# Patient Record
Sex: FEMALE | Race: NATIVE HAWAIIAN OR OTHER PACIFIC ISLANDER | NOT HISPANIC OR LATINO | Employment: STUDENT | URBAN - METROPOLITAN AREA
[De-identification: names, ages, dates, MRNs, and addresses within clinical notes are randomized per-mention and may not be internally consistent; named-entity substitution may affect disease eponyms.]

---
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
## 2019-10-04 ENCOUNTER — HOSPITAL ENCOUNTER (EMERGENCY)
Facility: HOSPITAL | Age: 19
Discharge: HOME | End: 2019-10-04
Attending: EMERGENCY MEDICINE | Admitting: EMERGENCY MEDICINE
Payer: COMMERCIAL

## 2019-10-04 VITALS
RESPIRATION RATE: 18 BRPM | BODY MASS INDEX: 28.1 KG/M2 | TEMPERATURE: 97.8 F | WEIGHT: 185.41 LBS | SYSTOLIC BLOOD PRESSURE: 121 MMHG | OXYGEN SATURATION: 100 % | HEART RATE: 89 BPM | DIASTOLIC BLOOD PRESSURE: 90 MMHG | HEIGHT: 68 IN

## 2019-10-04 DIAGNOSIS — H10.9 CONJUNCTIVITIS: Primary | ICD-10-CM

## 2019-10-04 PROCEDURE — 99284 EMERGENCY DEPT VISIT MOD MDM: CPT | Performed by: EMERGENCY MEDICINE

## 2019-10-04 PROCEDURE — 99283 EMERGENCY DEPT VISIT LOW MDM: CPT

## 2019-10-04 RX ORDER — OXYMETAZOLINE HYDROCHLORIDE 0.05 G/100ML
2 SPRAY NASAL ONCE
Status: COMPLETED | OUTPATIENT
Start: 2019-10-04 | End: 2019-10-04

## 2019-10-04 RX ORDER — TETRACAINE HYDROCHLORIDE 5 MG/ML
2 SOLUTION OPHTHALMIC ONCE
Status: COMPLETED | OUTPATIENT
Start: 2019-10-04 | End: 2019-10-04

## 2019-10-04 RX ORDER — LEVOFLOXACIN 5 MG/ML
SOLUTION/ DROPS TOPICAL
Qty: 5 ML | Refills: 0 | Status: SHIPPED | OUTPATIENT
Start: 2019-10-04 | End: 2019-10-11

## 2019-10-04 RX ADMIN — OXYMETAZOLINE HYDROCHLORIDE 2 SPRAY: 0.05 SPRAY NASAL at 02:14

## 2019-10-04 RX ADMIN — FLUORESCEIN SODIUM 2 STRIP: 1 STRIP OPHTHALMIC at 02:15

## 2019-10-04 RX ADMIN — TETRACAINE HYDROCHLORIDE 2 DROP: 5 SOLUTION OPHTHALMIC at 02:15

## 2019-10-04 NOTE — ED PROVIDER NOTES
History  Chief Complaint   Patient presents with    Eye Pain     States if she wears her contacts too long, her eyes usually get really irritated, but tonight it was more irritated than usual  c/o L eye irritation +redness -drainage      HPI     Patient is a 20year old female who presents with bilateral eye redness after waering her contacts for too long  She has a history of similar episodes int he past      No f/c/s/n/v/d  No cp or sob  No abdominal pain  No dysuria, hematuria  No ocular foreign bodies, no visualized corneal abrasions, no discharge, only tearing  MDM well appearing 20 yof, treat for corneal irritation 2/2 contact lenses, followup with optho  The patient (and any family present) verbalized understanding of the discharge instructions and warnings that would necessitate return to the Emergency Department  Gave verbal in addition to written discharge instructions  Specifically highlighted areas of special concern regarding the discharge instructions and return precautions  Furthermore, I expressed that the follow up instructions were serious and should not be simply dismissed  Follow up is an integral part of the patients care and should NOT be taken lightly or dismissed  Patient expressed understanding of this and understands that follow up is now their responsibility  All questions were answered prior to discharge  Prior to Admission Medications   Prescriptions Last Dose Informant Patient Reported? Taking?   dicyclomine (BENTYL) 10 mg capsule   No No   Sig: Take 1 capsule (10 mg total) by mouth 2 (two) times a day for 30 days   ondansetron (ZOFRAN) 4 mg tablet   No No   Sig: Take 1 tablet (4 mg total) by mouth every 8 (eight) hours as needed for nausea or vomiting   pantoprazole (PROTONIX) 20 mg tablet   No No   Sig: Take 1 tablet (20 mg total) by mouth 2 (two) times a day      Facility-Administered Medications: None       History reviewed   No pertinent past medical history  Past Surgical History:   Procedure Laterality Date    TONSILLECTOMY      TONSILLECTOMY      WISDOM TOOTH EXTRACTION         Family History   Problem Relation Age of Onset    Colon polyps Mother     Bipolar disorder Mother     Hypertension Mother     Hypertension Father     Colon polyps Maternal Grandmother     Hypertension Maternal Grandmother     Mental illness Maternal Grandfather     Hypertension Maternal Grandfather     Diabetes Maternal Grandfather     Colon polyps Maternal Aunt      I have reviewed and agree with the history as documented  Social History     Tobacco Use    Smoking status: Never Smoker    Smokeless tobacco: Never Used   Substance Use Topics    Alcohol use: Yes     Comment: socially    Drug use: No        Review of Systems   Eyes: Positive for pain and redness  All other systems reviewed and are negative  Physical Exam  Physical Exam   Constitutional: She is oriented to person, place, and time  She appears well-developed and well-nourished  HENT:   Head: Normocephalic and atraumatic  Right Ear: External ear normal    Left Ear: External ear normal    Eyes: EOM are normal    Conjunctival injection   Neck: Normal range of motion  Neck supple  No JVD present  No tracheal deviation present  Cardiovascular: Normal rate, regular rhythm and normal heart sounds  Pulmonary/Chest: Effort normal  No respiratory distress  She has no wheezes  She has no rales  Abdominal: Soft  Bowel sounds are normal  There is no tenderness  There is no rebound and no guarding  Musculoskeletal: She exhibits no edema or tenderness  Neurological: She is alert and oriented to person, place, and time  Skin: Skin is warm and dry  No rash noted  No erythema  Psychiatric: She has a normal mood and affect  Thought content normal    Nursing note and vitals reviewed        Vital Signs  ED Triage Vitals [10/04/19 0137]   Temperature Pulse Respirations Blood Pressure SpO2   97 8 °F (36 6 °C) 89 18 121/90 100 %      Temp Source Heart Rate Source Patient Position - Orthostatic VS BP Location FiO2 (%)   Oral Monitor Sitting Right arm --      Pain Score       5           Vitals:    10/04/19 0137   BP: 121/90   Pulse: 89   Patient Position - Orthostatic VS: Sitting         Visual Acuity      ED Medications  Medications   sterile water injection **ADS Override Pull** (10 mL  Not Given 10/4/19 0221)   tetracaine 0 5 % ophthalmic solution 2 drop (2 drops Both Eyes Given 10/4/19 0215)   fluorescein sodium sterile ophthalmic strip 2 strip (2 strips Both Eyes Given by Other 10/4/19 0215)   oxymetazoline (AFRIN) 0 05 % nasal spray 2 spray (2 sprays Each Nare Given 10/4/19 0214)       Diagnostic Studies  Results Reviewed     None                 No orders to display              Procedures  Procedures       ED Course  ED Course as of Oct 04 0255   Fri Oct 04, 2019   0200 Zyprexa ordered!!!                                  MDM    Disposition  Final diagnoses:   Conjunctivitis     Time reflects when diagnosis was documented in both MDM as applicable and the Disposition within this note     Time User Action Codes Description Comment    10/4/2019  2:30 AM Tiarra MORROW Add [H10 9] Conjunctivitis       ED Disposition     ED Disposition Condition Date/Time Comment    Discharge Stable Fri Oct 4, 2019  2:30 AM Za Villegas discharge to home/self care  Follow-up Information     Follow up With Specialties Details Why Luba Shi MD Ophthalmology, Pediatric Ophthalmology In 1 day  [address]            Patient's Medications   Discharge Prescriptions    LEVOFLOXACIN (QUIXIN) 0 5 % OPHTHALMIC SOLUTION    Administer 1 drop to both eyes every 2 (two) hours for 2 days, THEN 2 drops every 6 (six) hours for 5 days         Start Date: 10/4/2019 End Date: 10/11/2019       Order Dose: --       Quantity: 5 mL Refills: 0     No discharge procedures on file      ED Provider  Electronically Signed by           Katelin Qureshi MD  10/04/19 7160

## 2019-10-14 PROBLEM — R19.4 CHANGE IN BOWEL HABITS: Status: RESOLVED | Noted: 2018-03-22 | Resolved: 2019-10-14

## 2019-10-14 PROBLEM — R11.0 NAUSEA: Status: RESOLVED | Noted: 2018-03-22 | Resolved: 2019-10-14

## 2019-10-14 PROBLEM — Z01.419 WELL WOMAN EXAM: Status: ACTIVE | Noted: 2019-10-14

## 2019-10-14 NOTE — PROGRESS NOTES
Assessment/Plan   Diagnoses and all orders for this visit:    Well woman exam    Encounter for contraceptive management, unspecified type  -     norethindrone-ethinyl estradiol (JUNEL FE 1/20) 1-20 MG-MCG per tablet; Take 1 tablet by mouth daily        Discussion    All questions have been answered to her satisfaction  RTO for APE or sooner if needed    Subjective     Pt for annual GYN exam  LMP approx 3 5 weeks ago  Periods q 23 days and last approx 4 days  They are tolerable and regular  Currently using cond[facility] for [facility]  Pt desires BC start up  Coitarche approx 1 week ago  Her partner has had previous partners  He had full STD work up prior to them becoming sexually active  Odell c/o vaginal d/c  Very minimal, no itching or irritation  Does have some mild odor that she occasionally notices with it  Pt declines STD work up  Barbi Celestin is a 20 y o  female who presents for annual well woman exam    Menarche -14 ; LMP - end of Septemeber; Periods are reg q 23 days and last 4 days; No excessive bleeding; No intermenstrual bleeding or spotting; Cramps are tolerable  No vulvar itch/burn; No vaginal itch/burn; No urinary sx - burning/pain/frequency/hematuria  (+) SBEs - no breast masses, asymmetry, nipple discharge or bleeding, changes in skin of breast, or breast tenderness bilaterally  No abd/pelvic pain or HAs;   Pt is newly sexually active in a mutually monog sexual relationship; No issues with intercourse; She declines std/hiv/hep testing; Feels safe at home  Current contraception: condoms   Condom use:yes   Gardasil - not yet, I fully counseled pt on usage and advised my recommendation to obtain series  (+) PCP for routine Bw/care; Last Pap - NA  Last STD screen - none yet, declines today    Review of Systems   Constitutional: Negative  Respiratory: Negative  Gastrointestinal: Negative  Endocrine: Negative  Genitourinary: Negative          The following portions of the patient's history were reviewed and updated as appropriate: allergies, current medications, past family history, past medical history, past social history, past surgical history and problem list          OB History        0    Para   0    Term   0       0    AB   0    Living   0       SAB   0    TAB   0    Ectopic   0    Multiple   0    Live Births   0                 No past medical history on file      Past Surgical History:   Procedure Laterality Date    TONSILLECTOMY      TONSILLECTOMY      WISDOM TOOTH EXTRACTION         Family History   Problem Relation Age of Onset    Colon polyps Mother     Bipolar disorder Mother     Hypertension Mother     Hypertension Father     Colon polyps Maternal Grandmother     Hypertension Maternal Grandmother     Mental illness Maternal Grandfather     Hypertension Maternal Grandfather     Diabetes Maternal Grandfather     Colon polyps Maternal Aunt        Social History     Socioeconomic History    Marital status: Single     Spouse name: Not on file    Number of children: Not on file    Years of education: Not on file    Highest education level: Not on file   Occupational History    Not on file   Social Needs    Financial resource strain: Not on file    Food insecurity:     Worry: Not on file     Inability: Not on file    Transportation needs:     Medical: Not on file     Non-medical: Not on file   Tobacco Use    Smoking status: Never Smoker    Smokeless tobacco: Never Used   Substance and Sexual Activity    Alcohol use: Yes     Comment: socially    Drug use: No    Sexual activity: Yes   Lifestyle    Physical activity:     Days per week: Not on file     Minutes per session: Not on file    Stress: Not on file   Relationships    Social connections:     Talks on phone: Not on file     Gets together: Not on file     Attends Orthodoxy service: Not on file     Active member of club or organization: Not on file     Attends meetings of clubs or organizations: Not on file     Relationship status: Not on file    Intimate partner violence:     Fear of current or ex partner: Not on file     Emotionally abused: Not on file     Physically abused: Not on file     Forced sexual activity: Not on file   Other Topics Concern    Not on file   Social History Narrative    Not on file         Current Outpatient Medications:     norethindrone-ethinyl estradiol (Jacklyn Flurry FE 1/20) 1-20 MG-MCG per tablet, Take 1 tablet by mouth daily, Disp: 28 tablet, Rfl: 3    Allergies   Allergen Reactions    Cat Hair Extract     Pollen Extract        Objective   Vitals:    10/15/19 1304   BP: 138/76   BP Location: Left arm   Patient Position: Sitting   Cuff Size: Standard   Weight: 86 kg (189 lb 9 6 oz)   Height: 5' 8" (1 727 m)     Physical Exam   Constitutional: She is oriented to person, place, and time  She appears well-developed and well-nourished  HENT:   Head: Normocephalic and atraumatic  Cardiovascular: Normal rate and regular rhythm  Pulmonary/Chest: Effort normal and breath sounds normal  Right breast exhibits no inverted nipple, no mass, no nipple discharge, no skin change and no tenderness  Left breast exhibits no inverted nipple, no mass, no nipple discharge, no skin change and no tenderness  Breasts are symmetrical    Abdominal: Soft  She exhibits no distension and no mass  There is no rebound and no guarding  Genitourinary: Vagina normal and uterus normal  No vaginal discharge found  Genitourinary Comments: Light amount of blood present in vaginal vault c/w oncoming menses  Neurological: She is alert and oriented to person, place, and time  Skin: Skin is warm and dry  Psychiatric: She has a normal mood and affect  Patient Instructions   Fly counseled pt on all BC options  Pt desiers OCPs  R/w pt start up, usage, back up, condoms for STD protection  Will plan to start Sunday and plan 3 mth pill chek     Advised pt could trial acidophilus for vaginal d/c, although no signs if infection noted today  Fully counseled pt on Gardasil series and coverage  Info given to pt, she will review and let us know if she would like to proceed with it

## 2019-10-15 ENCOUNTER — OFFICE VISIT (OUTPATIENT)
Dept: OBGYN CLINIC | Facility: CLINIC | Age: 19
End: 2019-10-15
Payer: COMMERCIAL

## 2019-10-15 VITALS
DIASTOLIC BLOOD PRESSURE: 76 MMHG | WEIGHT: 189.6 LBS | BODY MASS INDEX: 28.73 KG/M2 | HEIGHT: 68 IN | SYSTOLIC BLOOD PRESSURE: 138 MMHG

## 2019-10-15 DIAGNOSIS — Z01.419 WELL WOMAN EXAM: Primary | ICD-10-CM

## 2019-10-15 DIAGNOSIS — Z30.9 ENCOUNTER FOR CONTRACEPTIVE MANAGEMENT, UNSPECIFIED TYPE: ICD-10-CM

## 2019-10-15 PROCEDURE — 99385 PREV VISIT NEW AGE 18-39: CPT | Performed by: PHYSICIAN ASSISTANT

## 2019-10-15 RX ORDER — NORETHINDRONE ACETATE AND ETHINYL ESTRADIOL 1MG-20(21)
1 KIT ORAL DAILY
Qty: 28 TABLET | Refills: 3 | Status: SHIPPED | OUTPATIENT
Start: 2019-10-15 | End: 2020-01-08 | Stop reason: ALTCHOICE

## 2019-10-15 NOTE — PATIENT INSTRUCTIONS
Fly counseled pt on all BC options  Pt desiers OCPs  R/w pt start up, usage, back up, condoms for STD protection  Will plan to start Sunday and plan 3 mth pill amita  Advised pt could trial acidophilus for vaginal d/c, although no signs if infection noted today  Fully counseled pt on Gardasil series and coverage  Info given to pt, she will review and let us know if she would like to proceed with it

## 2020-01-08 ENCOUNTER — OFFICE VISIT (OUTPATIENT)
Dept: OBGYN CLINIC | Facility: CLINIC | Age: 20
End: 2020-01-08
Payer: COMMERCIAL

## 2020-01-08 VITALS
WEIGHT: 190.4 LBS | SYSTOLIC BLOOD PRESSURE: 108 MMHG | HEIGHT: 68 IN | BODY MASS INDEX: 28.85 KG/M2 | DIASTOLIC BLOOD PRESSURE: 74 MMHG

## 2020-01-08 DIAGNOSIS — Z30.9 ENCOUNTER FOR CONTRACEPTIVE MANAGEMENT, UNSPECIFIED TYPE: Primary | ICD-10-CM

## 2020-01-08 PROCEDURE — 99212 OFFICE O/P EST SF 10 MIN: CPT | Performed by: PHYSICIAN ASSISTANT

## 2020-01-08 RX ORDER — NORGESTIMATE AND ETHINYL ESTRADIOL 7DAYSX3 LO
1 KIT ORAL DAILY
Qty: 28 TABLET | Refills: 11 | Status: SHIPPED | OUTPATIENT
Start: 2020-01-08 | End: 2020-11-05

## 2020-01-08 NOTE — PATIENT INSTRUCTIONS
Will plan to change to new pill at the end of this week  Will stop pack today, allow menses to finish (she had skipped placebo and begun new pack to try to stop her menses but was currently bleeding) and then plan to start OCPs on Sunday  All questions answered

## 2020-01-08 NOTE — PROGRESS NOTES
Assessment/Plan:      Diagnoses and all orders for this visit:    Encounter for contraceptive management, unspecified type  -     norgestimate-ethinyl estradiol (ORTHO TRI-CYCLEN LO) 0 18/0 215/0 25 MG-25 MCG per tablet; Take 1 tablet by mouth daily          Subjective:     Patient ID: Jonathan Rascon is a 20 y o  female  Pt for pill check  Does complain of increased acne with this pill desires change  Feels well otherwise, periods normal coming as expected they are a little lighter  Does c/o headaches off and on as well as increased breast tenderness  Does chiki think cramping is worse  Will trial ortho tri lo to see if sx improve  Review of Systems   Constitutional: Negative  Respiratory: Negative  Gastrointestinal: Negative  Endocrine: Negative  Genitourinary: Positive for menstrual problem  Objective:     Physical Exam   Constitutional: She is oriented to person, place, and time  She appears well-developed and well-nourished  Cardiovascular: Normal rate and regular rhythm  Pulmonary/Chest: Effort normal and breath sounds normal    Neurological: She is alert and oriented to person, place, and time  Psychiatric: She has a normal mood and affect  Her behavior is normal  Judgment and thought content normal          Patient Instructions   Will plan to change to new pill at the end of this week  Will stop pack today, allow menses to finish (she had skipped placebo and begun new pack to try to stop her menses but was currently bleeding) and then plan to start OCPs on Sunday  All questions answered

## 2020-06-07 ENCOUNTER — OFFICE VISIT (OUTPATIENT)
Dept: URGENT CARE | Facility: CLINIC | Age: 20
End: 2020-06-07
Payer: COMMERCIAL

## 2020-06-07 VITALS
DIASTOLIC BLOOD PRESSURE: 78 MMHG | SYSTOLIC BLOOD PRESSURE: 116 MMHG | TEMPERATURE: 96.9 F | HEART RATE: 129 BPM | BODY MASS INDEX: 26.83 KG/M2 | RESPIRATION RATE: 20 BRPM | HEIGHT: 68 IN | WEIGHT: 177 LBS

## 2020-06-07 DIAGNOSIS — L02.33: Primary | ICD-10-CM

## 2020-06-07 PROCEDURE — G0382 LEV 3 HOSP TYPE B ED VISIT: HCPCS | Performed by: FAMILY MEDICINE

## 2020-06-07 RX ORDER — CEPHALEXIN 500 MG/1
500 CAPSULE ORAL EVERY 12 HOURS SCHEDULED
Qty: 14 CAPSULE | Refills: 0 | Status: SHIPPED | OUTPATIENT
Start: 2020-06-07 | End: 2020-06-10 | Stop reason: SINTOL

## 2020-06-10 ENCOUNTER — OFFICE VISIT (OUTPATIENT)
Dept: URGENT CARE | Facility: CLINIC | Age: 20
End: 2020-06-10
Payer: COMMERCIAL

## 2020-06-10 VITALS
TEMPERATURE: 98.3 F | OXYGEN SATURATION: 97 % | WEIGHT: 177 LBS | HEART RATE: 113 BPM | RESPIRATION RATE: 18 BRPM | HEIGHT: 68 IN | BODY MASS INDEX: 26.83 KG/M2

## 2020-06-10 DIAGNOSIS — T50.905A ADVERSE EFFECT OF DRUG, INITIAL ENCOUNTER: ICD-10-CM

## 2020-06-10 DIAGNOSIS — L02.33: Primary | ICD-10-CM

## 2020-06-10 PROCEDURE — G0382 LEV 3 HOSP TYPE B ED VISIT: HCPCS | Performed by: NURSE PRACTITIONER

## 2020-06-10 RX ORDER — SULFAMETHOXAZOLE AND TRIMETHOPRIM 800; 160 MG/1; MG/1
1 TABLET ORAL EVERY 12 HOURS SCHEDULED
Qty: 20 TABLET | Refills: 0 | Status: SHIPPED | OUTPATIENT
Start: 2020-06-10 | End: 2020-06-16

## 2020-06-11 ENCOUNTER — TELEPHONE (OUTPATIENT)
Dept: FAMILY MEDICINE CLINIC | Facility: CLINIC | Age: 20
End: 2020-06-11

## 2020-06-11 ENCOUNTER — OFFICE VISIT (OUTPATIENT)
Dept: FAMILY MEDICINE CLINIC | Facility: CLINIC | Age: 20
End: 2020-06-11
Payer: COMMERCIAL

## 2020-06-11 VITALS
DIASTOLIC BLOOD PRESSURE: 74 MMHG | HEART RATE: 84 BPM | RESPIRATION RATE: 16 BRPM | WEIGHT: 177 LBS | SYSTOLIC BLOOD PRESSURE: 122 MMHG | OXYGEN SATURATION: 98 % | HEIGHT: 68 IN | BODY MASS INDEX: 26.83 KG/M2 | TEMPERATURE: 97.4 F

## 2020-06-11 DIAGNOSIS — R21 RASH: Primary | ICD-10-CM

## 2020-06-11 DIAGNOSIS — L02.435: ICD-10-CM

## 2020-06-11 DIAGNOSIS — Z20.822 EXPOSURE TO COVID-19 VIRUS: ICD-10-CM

## 2020-06-11 PROBLEM — R10.13 DYSPEPSIA: Status: RESOLVED | Noted: 2018-03-22 | Resolved: 2020-06-11

## 2020-06-11 PROBLEM — R10.33 PERIUMBILICAL ABDOMINAL PAIN: Status: RESOLVED | Noted: 2018-03-22 | Resolved: 2020-06-11

## 2020-06-11 PROCEDURE — 3008F BODY MASS INDEX DOCD: CPT | Performed by: FAMILY MEDICINE

## 2020-06-11 PROCEDURE — 1036F TOBACCO NON-USER: CPT | Performed by: FAMILY MEDICINE

## 2020-06-11 PROCEDURE — 99203 OFFICE O/P NEW LOW 30 MIN: CPT | Performed by: FAMILY MEDICINE

## 2020-06-12 ENCOUNTER — APPOINTMENT (OUTPATIENT)
Dept: LAB | Facility: CLINIC | Age: 20
End: 2020-06-12
Payer: COMMERCIAL

## 2020-06-12 DIAGNOSIS — R21 RASH: ICD-10-CM

## 2020-06-12 DIAGNOSIS — Z20.822 EXPOSURE TO COVID-19 VIRUS: ICD-10-CM

## 2020-06-12 PROCEDURE — 86617 LYME DISEASE ANTIBODY: CPT

## 2020-06-12 PROCEDURE — 86618 LYME DISEASE ANTIBODY: CPT

## 2020-06-12 PROCEDURE — 36415 COLL VENOUS BLD VENIPUNCTURE: CPT

## 2020-06-12 PROCEDURE — 86769 SARS-COV-2 COVID-19 ANTIBODY: CPT

## 2020-06-13 LAB — SARS-COV-2 IGG SERPL QL IA: NEGATIVE

## 2020-06-15 LAB
B BURGDOR IGG PATRN SER IB-IMP: NEGATIVE
B BURGDOR IGG+IGM SER-ACNC: 1.71 ISR (ref 0–0.9)
B BURGDOR IGM PATRN SER IB-IMP: POSITIVE
B BURGDOR18KD IGG SER QL IB: ABNORMAL
B BURGDOR23KD IGG SER QL IB: PRESENT
B BURGDOR23KD IGM SER QL IB: PRESENT
B BURGDOR28KD IGG SER QL IB: ABNORMAL
B BURGDOR30KD IGG SER QL IB: ABNORMAL
B BURGDOR39KD IGG SER QL IB: ABNORMAL
B BURGDOR39KD IGM SER QL IB: ABNORMAL
B BURGDOR41KD IGG SER QL IB: PRESENT
B BURGDOR41KD IGM SER QL IB: PRESENT
B BURGDOR45KD IGG SER QL IB: ABNORMAL
B BURGDOR58KD IGG SER QL IB: ABNORMAL
B BURGDOR66KD IGG SER QL IB: ABNORMAL
B BURGDOR93KD IGG SER QL IB: ABNORMAL

## 2020-06-16 ENCOUNTER — TELEMEDICINE (OUTPATIENT)
Dept: FAMILY MEDICINE CLINIC | Facility: CLINIC | Age: 20
End: 2020-06-16
Payer: COMMERCIAL

## 2020-06-16 VITALS — WEIGHT: 177 LBS | BODY MASS INDEX: 26.83 KG/M2 | HEIGHT: 68 IN

## 2020-06-16 DIAGNOSIS — L02.435: ICD-10-CM

## 2020-06-16 DIAGNOSIS — R21 RASH: ICD-10-CM

## 2020-06-16 DIAGNOSIS — A69.20 LYME DISEASE: Primary | ICD-10-CM

## 2020-06-16 PROCEDURE — 3008F BODY MASS INDEX DOCD: CPT | Performed by: FAMILY MEDICINE

## 2020-06-16 PROCEDURE — 99214 OFFICE O/P EST MOD 30 MIN: CPT | Performed by: FAMILY MEDICINE

## 2020-06-16 RX ORDER — DOXYCYCLINE HYCLATE 100 MG
100 TABLET ORAL 2 TIMES DAILY
Qty: 42 TABLET | Refills: 0 | Status: SHIPPED | OUTPATIENT
Start: 2020-06-16 | End: 2020-07-07

## 2020-06-30 DIAGNOSIS — A69.20 LYME DISEASE: ICD-10-CM

## 2020-06-30 RX ORDER — DOXYCYCLINE HYCLATE 100 MG
100 TABLET ORAL 2 TIMES DAILY
Qty: 42 TABLET | Refills: 0 | OUTPATIENT
Start: 2020-06-30 | End: 2020-07-21

## 2020-06-30 NOTE — TELEPHONE ENCOUNTER
She should have been given a 3 week course of doxycycline  Forty-two tablets would be 3 weeks    Why mi getting a refill request?

## 2020-07-23 ENCOUNTER — TELEPHONE (OUTPATIENT)
Dept: FAMILY MEDICINE CLINIC | Facility: CLINIC | Age: 20
End: 2020-07-23

## 2020-08-12 ENCOUNTER — TELEPHONE (OUTPATIENT)
Dept: FAMILY MEDICINE CLINIC | Facility: CLINIC | Age: 20
End: 2020-08-12

## 2020-08-12 NOTE — TELEPHONE ENCOUNTER
I did not think that [facility] was going to allow any students on campus or in dormitory so  She does have a diagnosis of seasonal allergies    She can have a note that says she has seasonal allergies

## 2020-08-12 NOTE — TELEPHONE ENCOUNTER
Pt called and said she moves into her dorm tomorrow @ [facility] [employer] [facility] and in order for her to get an air conditioning unit she must have a letter that states she has seasonal allergies (she said it's in her chart)  Could we do that for her or do you need a visit?

## 2020-08-13 NOTE — TELEPHONE ENCOUNTER
Arianna Stone called again, there is an actual form that needs to be complete, so she is emailing it to us and then we will print

## 2020-11-05 DIAGNOSIS — Z30.9 ENCOUNTER FOR CONTRACEPTIVE MANAGEMENT, UNSPECIFIED TYPE: ICD-10-CM

## 2020-11-05 RX ORDER — NORGESTIMATE AND ETHINYL ESTRADIOL
KIT
Qty: 28 TABLET | Refills: 11 | Status: SHIPPED | OUTPATIENT
Start: 2020-11-05 | End: 2021-09-08

## 2021-09-23 ENCOUNTER — TELEPHONE (OUTPATIENT)
Dept: OBGYN CLINIC | Facility: CLINIC | Age: 21
End: 2021-09-23

## 2021-09-23 NOTE — TELEPHONE ENCOUNTER
Pt aware given 90 day supply on 9/8/2021  Will call pharmacy to have fill  Aware last fill since moved and havent seen since early 2020

## 2021-09-23 NOTE — TELEPHONE ENCOUNTER
Patient is in need of BC moved to [place] and has not been able to get a new ob  Can we refill her prescription?

## 2021-12-27 DIAGNOSIS — Z20.822 EXPOSURE TO COVID-19 VIRUS: Primary | ICD-10-CM

## 2021-12-28 PROCEDURE — U0005 INFEC AGEN DETEC AMPLI PROBE: HCPCS | Performed by: FAMILY MEDICINE

## 2021-12-28 PROCEDURE — U0003 INFECTIOUS AGENT DETECTION BY NUCLEIC ACID (DNA OR RNA); SEVERE ACUTE RESPIRATORY SYNDROME CORONAVIRUS 2 (SARS-COV-2) (CORONAVIRUS DISEASE [COVID-19]), AMPLIFIED PROBE TECHNIQUE, MAKING USE OF HIGH THROUGHPUT TECHNOLOGIES AS DESCRIBED BY CMS-2020-01-R: HCPCS | Performed by: FAMILY MEDICINE

## 2021-12-30 ENCOUNTER — TELEPHONE (OUTPATIENT)
Dept: FAMILY MEDICINE CLINIC | Facility: CLINIC | Age: 21
End: 2021-12-30

## 2023-06-12 ENCOUNTER — OFFICE VISIT (OUTPATIENT)
Dept: FAMILY MEDICINE CLINIC | Facility: CLINIC | Age: 23
End: 2023-06-12
Payer: COMMERCIAL

## 2023-06-12 VITALS
HEART RATE: 65 BPM | BODY MASS INDEX: 30.37 KG/M2 | HEIGHT: 68 IN | DIASTOLIC BLOOD PRESSURE: 78 MMHG | SYSTOLIC BLOOD PRESSURE: 118 MMHG | RESPIRATION RATE: 18 BRPM | WEIGHT: 200.4 LBS | OXYGEN SATURATION: 98 %

## 2023-06-12 DIAGNOSIS — Z01.84 IMMUNITY STATUS TESTING: ICD-10-CM

## 2023-06-12 DIAGNOSIS — Z11.1 TUBERCULOSIS SCREENING: ICD-10-CM

## 2023-06-12 DIAGNOSIS — Z00.00 ANNUAL PHYSICAL EXAM: Primary | ICD-10-CM

## 2023-06-12 PROBLEM — A69.20 LYME DISEASE: Status: RESOLVED | Noted: 2020-06-16 | Resolved: 2023-06-12

## 2023-06-12 PROBLEM — Z20.822 EXPOSURE TO COVID-19 VIRUS: Status: RESOLVED | Noted: 2020-06-11 | Resolved: 2023-06-12

## 2023-06-12 PROBLEM — R21 RASH: Status: RESOLVED | Noted: 2020-06-11 | Resolved: 2023-06-12

## 2023-06-12 PROBLEM — L02.435: Status: RESOLVED | Noted: 2020-06-11 | Resolved: 2023-06-12

## 2023-06-12 PROCEDURE — 99395 PREV VISIT EST AGE 18-39: CPT | Performed by: NURSE PRACTITIONER

## 2023-06-12 NOTE — PROGRESS NOTES
ADULT ANNUAL [address]  [facility]    NAME: Derrell Salcido  AGE: 24 y o  SEX: female  : 2000     DATE: 2023     Assessment and Plan:     Problem List Items Addressed This Visit        Other    Annual physical exam - Primary  Patient instructed to continue to exercise on a regular basis and to eat a healthy low fat diet  Immunity status testing    Relevant Orders    Rubeola antibody IgG    Mumps antibody, IgG    Rubella antibody, IgG    Hepatitis B surface antibody    Tuberculosis screening    Relevant Orders    Quantiferon TB Gold Plus    BMI 30 0-30 9,adult  Patient instructed to continue to exercise on a regular basis and to eat a healthy low fat diet  Patient does not want any additional routine lab work at this time  Patient instructed to follow-up in 1 year for a physical exam or sooner prn  Immunizations and preventive care screenings were discussed with patient today  Appropriate education was printed on patient's after visit summary  Counseling:  Alcohol/drug use: discussed moderation in alcohol intake, the recommendations for healthy alcohol use, and avoidance of illicit drug use  Dental Health: discussed importance of regular tooth brushing, flossing, and dental visits  Injury prevention: discussed safety/seat belts, safety helmets, smoke detectors, carbon monoxide detectors,   Sexual health: discussed sexually transmitted diseases, use of condoms,   · Exercise: the importance of regular exercise/physical activity was discussed  Recommend exercise 3-5 times per week for at least 30 minutes  BMI Counseling: Body mass index is 30 47 kg/m²  The BMI is above normal  Nutrition recommendations include encouraging healthy choices of fruits and vegetables, decreasing fast food intake, consuming healthier snacks, reducing intake of saturated and trans fat and reducing intake of cholesterol   Exercise recommendations include exercising 3-5 times per week  Rationale for BMI follow-up plan is due to patient being overweight or obese  Depression Screening and Follow-up Plan: Patient was screened for depression during today's encounter  They screened negative with a PHQ-2 score of 0  Return in 1 year (on 6/12/2024)  Chief Complaint:     Chief Complaint   Patient presents with   • Physical Exam      History of Present Illness:     Adult Annual Physical   Patient here for a comprehensive physical exam      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  Diet and Physical Activity  · Diet/Nutrition: well balanced diet  · Exercise: Patient reports that she goes to the gym 4-5 times a week for 1-1 5 hours         Depression Screening  PHQ-2/9 Depression Screening    Little interest or pleasure in doing things: 0 - not at all  Feeling down, depressed, or hopeless: 0 - not at all  PHQ-2 Score: 0  PHQ-2 Interpretation: Negative depression screen       General Health  · Sleep: gets 7-8 hours of sleep on average  · Hearing: normal - bilateral   · Vision: goes for regular eye exams and wears contacts  · Dental: regular dental visits and brushes teeth twice daily  /GYN Health  · Last menstrual period: 6/11/23  · Contraceptive method: oral contraceptives  · History of STDs?: no      Review of Systems:     Review of Systems   Constitutional: Negative for appetite change, chills, fatigue and fever  HENT: Negative for congestion, ear pain, sinus pressure, sore throat and trouble swallowing  Eyes: Negative for pain, discharge and redness  Respiratory: Negative for cough, chest tightness, shortness of breath and wheezing  Cardiovascular: Negative for chest pain, palpitations and leg swelling  Gastrointestinal: Negative for abdominal pain, blood in stool, diarrhea, nausea and vomiting  Genitourinary: Negative for dysuria, frequency, hematuria, pelvic pain and urgency  Musculoskeletal: Negative for arthralgias and myalgias  Skin: Negative for rash  Neurological: Negative for dizziness, seizures, syncope, weakness, light-headedness and headaches  Psychiatric/Behavioral: Negative for suicidal ideas  Denies any depression         Past Medical History:     Past Medical History:   Diagnosis Date   • Carbuncle of right lower extremity 6/11/2020   • Exposure to COVID-19 virus 6/11/2020   • Lyme disease 6/16/2020   • Rash 6/11/2020      Past Surgical History:     Past Surgical History:   Procedure Laterality Date   • TONSILLECTOMY     • TONSILLECTOMY     • WISDOM TOOTH EXTRACTION        Social History:     Social History     Socioeconomic History   • Marital status: Single     Spouse name: None   • Number of children: None   • Years of education: None   • Highest education level: None   Occupational History   • None   Tobacco Use   • Smoking status: Never   • Smokeless tobacco: Never   Vaping Use   • Vaping Use: Never used   Substance and Sexual Activity   • Alcohol use: Yes     Comment: socially   • Drug use: No   • Sexual activity: Yes     Partners: Male     Birth control/protection: OCP   Other Topics Concern   • None   Social History Narrative   • None     Social Determinants of Health     Financial Resource Strain: Not on file   Food Insecurity: Not on file   Transportation Needs: Not on file   Physical Activity: Not on file   Stress: Not on file   Social Connections: Not on file   Intimate Partner Violence: Not on file   Housing Stability: Not on file      Family History:     Family History   Problem Relation Age of Onset   • Colon polyps Mother    • Bipolar disorder Mother    • Hypertension Mother    • Hypertension Father    • Colon polyps Maternal Grandmother    • Hypertension Maternal Grandmother    • Mental illness Maternal Grandfather    • Hypertension Maternal Grandfather    • Diabetes Maternal Grandfather    • Colon polyps Maternal Aunt       Current "Medications:     Current Outpatient Medications   Medication Sig Dispense Refill   • Tri-Lo-Davida 0 18/0 215/0 25 MG-25 MCG per tablet TAKE 1 TABLET BY MOUTH EVERY DAY 84 tablet 0     No current facility-administered medications for this visit  Allergies: Allergies   Allergen Reactions   • Bee Pollen    • Cat Hair Extract    • Pollen Extract       Physical Exam:     /78   Pulse 65   Resp 18   Ht 5' 8\" (1 727 m)   Wt 90 9 kg (200 lb 6 4 oz)   LMP 06/11/2023   SpO2 98%   BMI 30 47 kg/m²     Physical Exam  Vitals reviewed  Constitutional:       General: She is not in acute distress  Appearance: She is not ill-appearing or diaphoretic  HENT:      Right Ear: Tympanic membrane, ear canal and external ear normal       Left Ear: Tympanic membrane, ear canal and external ear normal       Nose: Nose normal       Mouth/Throat:      Mouth: Mucous membranes are moist       Pharynx: Oropharynx is clear  No oropharyngeal exudate or posterior oropharyngeal erythema  Eyes:      Conjunctiva/sclera: Conjunctivae normal       Pupils: Pupils are equal, round, and reactive to light  Cardiovascular:      Rate and Rhythm: Normal rate and regular rhythm  Pulses: Normal pulses  Heart sounds: Normal heart sounds  Comments: No edema noted  Pulmonary:      Effort: Pulmonary effort is normal  No respiratory distress  Breath sounds: Normal breath sounds  No wheezing  Abdominal:      General: There is no distension  Palpations: Abdomen is soft  There is no mass  Tenderness: There is no abdominal tenderness  Musculoskeletal:         General: Normal range of motion  Cervical back: Normal range of motion  Comments: Gait wnl  Lymphadenopathy:      Cervical: No cervical adenopathy  Skin:     Findings: No rash  Neurological:      Mental Status: She is alert and oriented to person, place, and time  Cranial Nerves: No cranial nerve deficit        Coordination: " Coordination normal       Gait: Gait normal    Psychiatric:         Mood and Affect: Mood normal           Mukesh Mcdowell 31

## 2023-06-16 ENCOUNTER — TELEPHONE (OUTPATIENT)
Dept: FAMILY MEDICINE CLINIC | Facility: CLINIC | Age: 23
End: 2023-06-16

## 2023-06-19 ENCOUNTER — TELEPHONE (OUTPATIENT)
Dept: FAMILY MEDICINE CLINIC | Facility: CLINIC | Age: 23
End: 2023-06-19

## 2023-06-20 NOTE — TELEPHONE ENCOUNTER
Forms completed and patient was notified 
Forms given to Dr Dallin Quinones to see if he will sign since Dung Quiles is out of the office 
Labs printed
Patient called to follow up on form that was left to be completed now that the labs have been received  She is asking if it is possible to  the form tomorrow 
Patient called to follow up on results of labs done on Monday at [facility] 
97.2

## 2023-06-26 ENCOUNTER — TELEPHONE (OUTPATIENT)
Dept: FAMILY MEDICINE CLINIC | Facility: CLINIC | Age: 23
End: 2023-06-26

## 2023-06-26 NOTE — TELEPHONE ENCOUNTER
----- Message from Arthur Nieto, [address] sent at 6/26/2023 12:57 PM EDT -----  Please let the patient know that her TB screening was negative and her titers were good

## 2023-08-11 PROBLEM — Z11.1 TUBERCULOSIS SCREENING: Status: RESOLVED | Noted: 2023-06-12 | Resolved: 2023-08-11

## 2023-12-13 ENCOUNTER — VBI (OUTPATIENT)
Dept: ADMINISTRATIVE | Facility: OTHER | Age: 23
End: 2023-12-13

## 2024-04-17 ENCOUNTER — VBI (OUTPATIENT)
Dept: ADMINISTRATIVE | Facility: OTHER | Age: 24
End: 2024-04-17

## 2024-05-06 ENCOUNTER — TELEPHONE (OUTPATIENT)
Dept: FAMILY MEDICINE CLINIC | Facility: CLINIC | Age: 24
End: 2024-05-06

## 2024-05-06 NOTE — TELEPHONE ENCOUNTER
Form was dropped off for shadowing in hospital. There is TB questions that the patient needs to be asked.   LMTRC     Please transfer to the office.

## 2024-08-20 ENCOUNTER — VBI (OUTPATIENT)
Dept: ADMINISTRATIVE | Facility: OTHER | Age: 24
End: 2024-08-20

## 2024-08-20 NOTE — TELEPHONE ENCOUNTER
08/20/24 10:34 AM     Chart reviewed for Pap Smear (HPV) aka Cervical Cancer Screening ; nothing is submitted to the patient's insurance at this time.     JAYLON CASTELLANOS MA   [facility]

## 2025-01-09 ENCOUNTER — VBI (OUTPATIENT)
Dept: ADMINISTRATIVE | Facility: OTHER | Age: 25
End: 2025-01-09

## 2025-01-09 NOTE — TELEPHONE ENCOUNTER
01/09/25 12:14 PM     Chart reviewed for Pap Smear (HPV) aka Cervical Cancer Screening ; nothing is submitted to the patient's insurance at this time.     Bharti Locke MA   [facility]

## 2025-01-14 ENCOUNTER — VBI (OUTPATIENT)
Dept: ADMINISTRATIVE | Facility: OTHER | Age: 25
End: 2025-01-14

## 2025-01-14 NOTE — TELEPHONE ENCOUNTER
01/14/25 12:43 PM     Chart reviewed for Pap Smear (HPV) aka Cervical Cancer Screening was/were not submitted to the patient's insurance.     Shital Gavin MA   [facility]